# Patient Record
Sex: FEMALE | URBAN - METROPOLITAN AREA
[De-identification: names, ages, dates, MRNs, and addresses within clinical notes are randomized per-mention and may not be internally consistent; named-entity substitution may affect disease eponyms.]

---

## 2021-04-20 ENCOUNTER — IMPORTED ENCOUNTER (OUTPATIENT)
Dept: URBAN - METROPOLITAN AREA CLINIC 1 | Facility: CLINIC | Age: 51
End: 2021-04-20

## 2021-04-20 PROBLEM — Z96.1: Noted: 2021-04-20

## 2021-04-20 PROBLEM — H16.143: Noted: 2021-04-20

## 2021-04-20 PROBLEM — R73.09: Noted: 2021-04-20

## 2021-04-20 PROBLEM — H04.123: Noted: 2021-04-20

## 2021-04-20 PROCEDURE — 92004 COMPRE OPH EXAM NEW PT 1/>: CPT

## 2021-04-20 NOTE — PATIENT DISCUSSION
1.  DM Type II (Diet Controlled) -- without sign of diabetic retinopathy and no blot heme on dilated retinal examination today OU No Macular Edema. Discussed the pathophysiology of diabetes and its effect on the eye and risk of blindness. Stressed the importance of strong glucose control. Advised of importance of at least yearly dilated examinations but to contact us immediately for any problems or concerns. 2. NEHEMIAH w/ PEK OU -- Recommended ATs TID OU routinely (Sample of Refresh Relieva given). 3.  Pseudophakia OU -- Doing well (2019; Done Elsewhere)Patient deferred MRx at today's visit. Letter to PCP. Return for an appointment in 1 year 27 with Dr. Priyank Fuentes.

## 2021-04-20 NOTE — PATIENT DISCUSSION
Pseudophakia OU -- Doing well (2019; Done Elsewhere)Patient deferred MRx at today's visit. Letter to PCP. Return for an appointment in 1 year 27 with Dr. Nikolas Molina.

## 2022-04-02 ASSESSMENT — TONOMETRY
OD_IOP_MMHG: 16
OS_IOP_MMHG: 16

## 2022-04-02 ASSESSMENT — VISUAL ACUITY
OS_SC: J2
OD_SC: J2
OD_CC: 20/20
OS_CC: 20/20